# Patient Record
Sex: FEMALE | Race: WHITE | NOT HISPANIC OR LATINO | Employment: OTHER | URBAN - METROPOLITAN AREA
[De-identification: names, ages, dates, MRNs, and addresses within clinical notes are randomized per-mention and may not be internally consistent; named-entity substitution may affect disease eponyms.]

---

## 2017-04-12 ENCOUNTER — OFFICE VISIT (OUTPATIENT)
Dept: ORTHOPEDIC SURGERY | Facility: CLINIC | Age: 77
End: 2017-04-12

## 2017-04-12 VITALS — BODY MASS INDEX: 28.88 KG/M2 | HEIGHT: 67 IN | TEMPERATURE: 99.2 F | WEIGHT: 184 LBS

## 2017-04-12 DIAGNOSIS — G89.29 CHRONIC PAIN OF RIGHT KNEE: Primary | ICD-10-CM

## 2017-04-12 DIAGNOSIS — M25.561 CHRONIC PAIN OF RIGHT KNEE: Primary | ICD-10-CM

## 2017-04-12 DIAGNOSIS — M17.11 PRIMARY OSTEOARTHRITIS OF RIGHT KNEE: ICD-10-CM

## 2017-04-12 PROCEDURE — 73560 X-RAY EXAM OF KNEE 1 OR 2: CPT | Performed by: ORTHOPAEDIC SURGERY

## 2017-04-12 PROCEDURE — 99204 OFFICE O/P NEW MOD 45 MIN: CPT | Performed by: ORTHOPAEDIC SURGERY

## 2017-04-12 RX ORDER — NAPROXEN SODIUM 220 MG
220 TABLET ORAL 2 TIMES DAILY PRN
COMMUNITY
End: 2018-05-30 | Stop reason: SDUPTHER

## 2017-04-12 RX ORDER — BENAZEPRIL HYDROCHLORIDE 20 MG/1
20 TABLET ORAL DAILY
COMMUNITY
End: 2018-05-30 | Stop reason: SDUPTHER

## 2017-04-12 RX ORDER — VERAPAMIL HYDROCHLORIDE 300 MG/1
300 CAPSULE, EXTENDED RELEASE ORAL NIGHTLY
COMMUNITY
End: 2019-05-09

## 2017-04-12 RX ORDER — ATORVASTATIN CALCIUM 40 MG/1
40 TABLET, FILM COATED ORAL DAILY
COMMUNITY

## 2017-04-12 RX ORDER — METOPROLOL TARTRATE 100 MG/1
100 TABLET ORAL 2 TIMES DAILY
COMMUNITY
End: 2019-05-09

## 2017-04-12 RX ORDER — CHLORAL HYDRATE 500 MG
CAPSULE ORAL
COMMUNITY

## 2017-04-12 NOTE — PROGRESS NOTES
Chief Complaint   Patient presents with   • Right Knee - Establish Care, Pain             HPI  At the end of March, patient went to get up out of chair, knee gave out.  She tried to stop herself from falling, and she twisted it.  It hurt for 4 days, then slowly got better.  She reports that it locks occasionally.  Going up and down steps is bothersome and worrisome.  She is worried of falling.She has a lot of pain and discomfort on the medial aspect of the knee.  When asked about her functional capability she states that she can barely walk across the parking lot without pain and discomfort.  Her knee tends to buckle and shift into varus with the her attempting to walk.  She is concerned about falling down the steps because going down the steps is very painful for her.  She states that there are some days when she is hurting so significantly that she cannot even get out of bed and then there are other days when her symptoms are not so bad.  She states that she has taken fish oil and anti-inflammatory medication to help her with her symptoms.  There is always a baseline grinding aching sensation which gets worse when she is on uneven surfaces or inclined planes.  Her quality of life is very negative and she is wanting to proceed with definitive care including considerations for total knee arthroplasty.          Allergies   Allergen Reactions   • Niaspan [Niacin Er] Itching         Social History     Social History   • Marital status:      Spouse name: N/A   • Number of children: N/A   • Years of education: N/A     Occupational History   • Not on file.     Social History Main Topics   • Smoking status: Not on file   • Smokeless tobacco: Not on file   • Alcohol use Not on file   • Drug use: Not on file   • Sexual activity: Not on file     Other Topics Concern   • Not on file     Social History Narrative       No family history on file.    No past surgical history on file.    No past medical history on  file.        Vitals:    04/12/17 1535   Temp: 99.2 °F (37.3 °C)             Review of Systems   Constitutional: Negative for chills, fever and unexpected weight change.   HENT: Negative for trouble swallowing and voice change.    Eyes: Negative for visual disturbance.   Respiratory: Negative for cough and shortness of breath.    Cardiovascular: Negative for chest pain and leg swelling.   Gastrointestinal: Negative for abdominal pain, nausea and vomiting.   Endocrine: Negative for cold intolerance and heat intolerance.   Genitourinary: Negative for difficulty urinating, frequency and urgency.   Skin: Negative for rash and wound.   Allergic/Immunologic: Negative for immunocompromised state.   Neurological: Negative for weakness and numbness.   Hematological: Does not bruise/bleed easily.   Psychiatric/Behavioral: Positive for sleep disturbance. Negative for dysphoric mood. The patient is not nervous/anxious.            Physical Exam   Constitutional: She is oriented to person, place, and time. She appears well-developed.   HENT:   Head: Normocephalic and atraumatic.   Eyes: Pupils are equal, round, and reactive to light.   Neck: Normal range of motion.   Cardiovascular: Intact distal pulses.    Pulmonary/Chest: Effort normal and breath sounds normal.   Abdominal: Soft. Bowel sounds are normal.   Musculoskeletal: Normal range of motion.   Neurological: She is alert and oriented to person, place, and time. She has normal reflexes.   Skin: Skin is warm.   Psychiatric: She has a normal mood and affect. Her behavior is normal. Judgment and thought content normal.   Nursing note and vitals reviewed.              Joint/Body Part Specific Exam:  right knee. Patient has crepitus throughout range of motion. Positive patellar grind test. Mild effusion. Lachman is negative. Pivot shift is negative. Anterior and posterior drawer signs are negative. Significant joint line tenderness is noted on the medial aspect of the knee.  Patient has a varus orientation of the knee. Range of motion in flexion is for 0- 110° degrees. Neurovascular status intact.  Dorsalis pedis and posterior tibial artery pulses are palpable. Common peroneal nerve function is well preserved. Patients gait is cautious and antalgic. Skin and soft tissues are swollen; consistent with synovitis and effusion          X-RAY Report:  right Knee X-Ray  Indication: Evaluation for pain and discomfort on the medial aspect of the knee  AP, Lateral views  Findings: Advanced degenerative osteoarthritis with complete loss of medial joint space  no bony lesion  Soft tissues within normal limits  decreased joint spaces  Hardware appropriately positioned not applicable      no prior studies available for comparison.    X-RAY was ordered and reviewed by Kalen Harris MD          Diagnostics:            Vimal was seen today for establish care and pain.    Diagnoses and all orders for this visit:    Chronic pain of right knee  -     XR knee 1 or 2 vw right            Procedures          I provided this patient with educational materials regarding bone health and cast or splint care.        Plan:   Weight bearing as tolerated.    Stretching and strengthening exercises of the quads and the hamstrings.    I have offered the patient a steroid injection as well as Synvisc Visco supplementation she is going to think about that.    Tablet Mobic 7.5 mg tab 1 by mouth daily when necessary pain and discomfort.    Use a supportive brace to prevent the knee from buckling and giving out.    This patient is going to need a total knee arthroplasty.    Risks and benefits potential complications discussed with the patient and her  Catalino.    Time spent in the office with the patient examining her and discussing different treatment options and making a surgical decision is 45 minutes.            CC To ANNETTE Gil

## 2017-05-09 ENCOUNTER — TELEPHONE (OUTPATIENT)
Dept: ORTHOPEDIC SURGERY | Facility: CLINIC | Age: 77
End: 2017-05-09

## 2017-06-19 ENCOUNTER — OUTSIDE FACILITY SERVICE (OUTPATIENT)
Dept: ORTHOPEDIC SURGERY | Facility: CLINIC | Age: 77
End: 2017-06-19

## 2017-06-19 PROCEDURE — 27447 TOTAL KNEE ARTHROPLASTY: CPT | Performed by: ORTHOPAEDIC SURGERY

## 2017-06-19 PROCEDURE — 20985 CPTR-ASST DIR MS PX: CPT | Performed by: ORTHOPAEDIC SURGERY

## 2017-07-10 RX ORDER — HYDROCODONE BITARTRATE AND ACETAMINOPHEN 5; 325 MG/1; MG/1
1 TABLET ORAL EVERY 6 HOURS PRN
Qty: 40 TABLET | Refills: 0 | Status: SHIPPED | OUTPATIENT
Start: 2017-07-10 | End: 2019-05-09

## 2017-07-10 NOTE — TELEPHONE ENCOUNTER
Patient is requesting a refill of Norco 5/325MG 1 po 6hrs prn pain she had R TKA on 6/19/17. Please Advise.

## 2017-07-11 NOTE — TELEPHONE ENCOUNTER
Patient has been informed via voicemail that her rx will be ready in our [place] office after 2pm today

## 2017-07-14 ENCOUNTER — OFFICE VISIT (OUTPATIENT)
Dept: ORTHOPEDIC SURGERY | Facility: CLINIC | Age: 77
End: 2017-07-14

## 2017-07-14 DIAGNOSIS — Z96.651 STATUS POST TOTAL RIGHT KNEE REPLACEMENT: Primary | ICD-10-CM

## 2017-07-14 PROCEDURE — 73560 X-RAY EXAM OF KNEE 1 OR 2: CPT | Performed by: ORTHOPAEDIC SURGERY

## 2017-07-14 PROCEDURE — 99024 POSTOP FOLLOW-UP VISIT: CPT | Performed by: ORTHOPAEDIC SURGERY

## 2017-07-14 NOTE — PROGRESS NOTES
Chief Complaint   Patient presents with   • Right Knee - Follow-up   • Wound Check         HPI the patient is here today for a follow up of her right total knee arthroplasty that was done on 6/19/17.Patient is doing extremely well postoperatively.  The swelling and bruising are settling down nicely.  Patient has completed her course of xarelto for DVT prophylaxis.  She is participating in a physical therapy program.  Patient is very pleased with her progress as her walking distance has improved tremendously.  She does not have nearly as much pain in her knee as she had prior to the surgical intervention.  Her clinical deformity is also very well corrected.        There were no vitals filed for this visit.        Joint/Body Part Specific Exam:  right knee.The patient is status post total knee arthroplasty postoperative 4 week(s). Incision is clean. Calf is soft and nontender. Homans sign is negative. There is no clicking, popping or catching. Anterior and posterior drawer signs are negative, Appropriate amounts of swelling and bruising are noted. Dorsalis pedis and posterior tibial artery pulses are palpable. Common peroneal nerve function is well preserved. Range of motion is from 0- 90° degrees. Gait is cautious but otherwise fairly normal. There is no evidence of a deep seated joint infection.      X-RAY REPORT:  right Knee X-Ray  Indication: Evaluation of implants after total knee arthroplasty  AP, Lateral views  Findings: Well placed implants with a good cement mantle no subsidence  no bony lesion  Soft tissues within normal limits  within normal limits joint spaces  Hardware appropriately positioned yes      yes prior studies available for comparison.    X-RAY was ordered and reviewed by Kalen Harris MD      Vimal was seen today for wound check and follow-up.    Diagnoses and all orders for this visit:    Status post total right knee replacement  -     XR Knee 1 or 2 View  Right          Procedures        Instructions:      Plan:Incision care.    Gentle active mobilization of the quads and the hamstrings.    Follow the total knee arthroplasty rehabilitation protocol.    Falls precautions.    Continue with outpatient physical therapy.    Patient is not asking me for any form off narcotic medication to control her pain and symptoms.    Tablet aspirin 325 mg stab 1 by mouth daily for DVT prophylaxis.    Follow-up in my office in 6 weeks.

## 2017-07-18 PROBLEM — Z96.651 STATUS POST TOTAL RIGHT KNEE REPLACEMENT: Status: ACTIVE | Noted: 2017-07-18

## 2017-08-24 ENCOUNTER — OFFICE VISIT (OUTPATIENT)
Dept: ORTHOPEDIC SURGERY | Facility: CLINIC | Age: 77
End: 2017-08-24

## 2017-08-24 DIAGNOSIS — Z96.651 STATUS POST TOTAL RIGHT KNEE REPLACEMENT: ICD-10-CM

## 2017-08-24 DIAGNOSIS — Z96.651 HISTORY OF TOTAL KNEE ARTHROPLASTY, RIGHT: Primary | ICD-10-CM

## 2017-08-24 PROCEDURE — 73560 X-RAY EXAM OF KNEE 1 OR 2: CPT | Performed by: ORTHOPAEDIC SURGERY

## 2017-08-24 PROCEDURE — 99024 POSTOP FOLLOW-UP VISIT: CPT | Performed by: ORTHOPAEDIC SURGERY

## 2017-08-24 NOTE — PROGRESS NOTES
Chief Complaint   Patient presents with   • Right Knee - Follow-up         HPI the patient is here today for a follow up of her right total knee arthroplasty that was done on 6/19/2017.Patient is doing extremely well at this point.  Range of motion is improving significantly.  Her pain is minimal.  She is not using any pain medication at this point.  She has completed her course of xarelto.  She is also graduated from her physical therapy protocol.  Overall she is very pleased with her outcome.             There were no vitals filed for this visit.        Joint/Body Part Specific Exam:  right knee.The patient is status post total knee arthroplasty postoperative 9 week(s). Incision is clean. Calf is soft and nontender. Homans sign is negative. There is no clicking, popping or catching. Anterior and posterior drawer signs are negative, Appropriate amounts of swelling and bruising are noted. Dorsalis pedis and posterior tibial artery pulses are palpable. Common peroneal nerve function is well preserved. Range of motion is from 0- 120° degrees. Gait is cautious but otherwise fairly normal. There is no evidence of a deep seated joint infection.      X-RAY REPORT:  right Knee X-Ray  Indication: Evaluation of implant position after total knee arthroplasty  AP, Lateral views  Findings: Well placed total knee arthroplasty implants without any subsidence.  no bony lesion  Soft tissues within normal limits  within normal limits joint spaces  Hardware appropriately positioned yes      yes prior studies available for comparison.    X-RAY was ordered and reviewed by Kalen Harris MD      Vimal was seen today for follow-up.    Diagnoses and all orders for this visit:    History of total knee arthroplasty, right  -     XR Knee 1 or 2 View Right    Status post total right knee replacement          Procedures        Instructions:      Plan:Incision care.    Weightbearing as tolerated.    Continue with aggressive physical therapy to  rehabilitate the total knee arthroplasty.    Tablet ibuprofen 600 mg tab 1 by mouth twice a day p.m. pain and discomfort.    Uses supportive Ace bandage from toes to groin to help decrease the swelling.    Ice for comfort.    Follow-up in my office in 6 months.    , GI and dental procedure prophylaxis with antibiotics to prevent metastatic infection to the knee arthroplasty implants.

## 2018-02-22 ENCOUNTER — OFFICE VISIT (OUTPATIENT)
Dept: ORTHOPEDIC SURGERY | Facility: CLINIC | Age: 78
End: 2018-02-22

## 2018-02-22 DIAGNOSIS — Z96.651 STATUS POST TOTAL RIGHT KNEE REPLACEMENT: ICD-10-CM

## 2018-02-22 DIAGNOSIS — Z96.651 HISTORY OF TOTAL KNEE ARTHROPLASTY, RIGHT: Primary | ICD-10-CM

## 2018-02-22 PROCEDURE — 73560 X-RAY EXAM OF KNEE 1 OR 2: CPT | Performed by: ORTHOPAEDIC SURGERY

## 2018-02-22 PROCEDURE — 99213 OFFICE O/P EST LOW 20 MIN: CPT | Performed by: ORTHOPAEDIC SURGERY

## 2018-05-30 ENCOUNTER — OFFICE VISIT (OUTPATIENT)
Dept: ORTHOPEDIC SURGERY | Facility: CLINIC | Age: 78
End: 2018-05-30

## 2018-05-30 VITALS — BODY MASS INDEX: 29.41 KG/M2 | HEIGHT: 66 IN | WEIGHT: 183 LBS | TEMPERATURE: 97.8 F

## 2018-05-30 DIAGNOSIS — M54.10 RADICULOPATHY, UNSPECIFIED SPINAL REGION: Primary | ICD-10-CM

## 2018-05-30 DIAGNOSIS — Z96.651 STATUS POST TOTAL RIGHT KNEE REPLACEMENT: ICD-10-CM

## 2018-05-30 PROCEDURE — 99213 OFFICE O/P EST LOW 20 MIN: CPT | Performed by: ORTHOPAEDIC SURGERY

## 2018-05-30 RX ORDER — ICOSAPENT ETHYL 1000 MG/1
CAPSULE ORAL
COMMUNITY
Start: 2018-05-14 | End: 2019-05-09

## 2018-05-30 RX ORDER — METOPROLOL SUCCINATE 100 MG/1
TABLET, EXTENDED RELEASE ORAL
COMMUNITY
Start: 2018-05-14

## 2018-05-30 RX ORDER — MELOXICAM 7.5 MG/1
TABLET ORAL
COMMUNITY
Start: 2018-05-10 | End: 2019-05-09

## 2018-05-30 RX ORDER — METHOCARBAMOL 750 MG/1
TABLET, FILM COATED ORAL
COMMUNITY
Start: 2018-04-16 | End: 2019-05-09

## 2018-05-30 RX ORDER — CLONIDINE HYDROCHLORIDE 0.1 MG/1
TABLET ORAL
COMMUNITY
Start: 2018-05-14

## 2018-05-30 RX ORDER — METHYLPREDNISOLONE 4 MG/1
TABLET ORAL
COMMUNITY
Start: 2018-04-20 | End: 2019-05-09

## 2018-05-30 RX ORDER — BENAZEPRIL HYDROCHLORIDE 40 MG/1
TABLET, FILM COATED ORAL
COMMUNITY
Start: 2018-05-14

## 2019-05-09 ENCOUNTER — OFFICE VISIT (OUTPATIENT)
Dept: ORTHOPEDIC SURGERY | Facility: CLINIC | Age: 79
End: 2019-05-09

## 2019-05-09 VITALS — BODY MASS INDEX: 30.18 KG/M2 | HEIGHT: 66 IN | WEIGHT: 187.8 LBS

## 2019-05-09 DIAGNOSIS — M16.12 PRIMARY OSTEOARTHRITIS OF LEFT HIP: Primary | ICD-10-CM

## 2019-05-09 DIAGNOSIS — Z96.651 STATUS POST TOTAL RIGHT KNEE REPLACEMENT: ICD-10-CM

## 2019-05-09 PROCEDURE — 99214 OFFICE O/P EST MOD 30 MIN: CPT | Performed by: ORTHOPAEDIC SURGERY

## 2019-05-09 PROCEDURE — 73502 X-RAY EXAM HIP UNI 2-3 VIEWS: CPT | Performed by: ORTHOPAEDIC SURGERY

## 2019-05-09 RX ORDER — VERAPAMIL HYDROCHLORIDE 360 MG/1
CAPSULE, DELAYED RELEASE PELLETS ORAL
COMMUNITY
Start: 2019-02-12

## 2019-05-09 NOTE — PROGRESS NOTES
NEW/FOLLOW UP VISIT    Patient: Vimal Garcia  ?  YOB: 1940  ?  Chief Complaint   Patient presents with   • Left Hip - Pain      ?  HPI: Patient is having increasing pain and discomfort in the left hip.  She has difficulty with rolling over in bed at night.  She has difficulty in going up and down the steps.  She states that she cannot walk for exercise because of the pain and limp caused by the hip pathology.  She has had this pain for over 1 year.  Her symptoms are progressively getting worse and have definitely declined in the past few months in terms of increased pain and decreased function.  The patient states that she is using a cane because of pain and discomfort from the hip.  The patient had a right total knee replacement performed by me about 2 years ago.  That surgical procedure helped her tremendously to improve her quality of life and to improve her walking and exercise profiles.  She is very pleased with the outcome from her right hip arthroplasty.  Pain Location: Left groin and anterior aspect of the hip.  Radiation: none  Quality: dull, aching and grinding  Intensity/Severity: moderate  Duration: Past 1 year.  Onset quality: gradual   Timing: intermittent  Aggravating Factors: Going up and down the steps.  Alleviating Factors: Resting the hip and use of a cane.  Previous Episodes: none mentioned  Associated Symptoms: pain, swelling, decreased ROM  Previous Treatment: Anti-inflammatory medication    This patient is an established patient.  This problem is not new to this examiner.      Allergies:   Allergies   Allergen Reactions   • Niacin Itching   • Niaspan [Niacin Er] Itching       Medications:   Home Medications:  Current Outpatient Medications on File Prior to Visit   Medication Sig   • atorvastatin (LIPITOR) 40 MG tablet Take 40 mg by mouth Daily.   • benazepril (LOTENSIN) 40 MG tablet    • CloNIDine (CATAPRES) 0.1 MG tablet    • metFORMIN (GLUCOPHAGE) 1000 MG tablet Take 1,000  "mg by mouth 2 (Two) Times a Day With Meals.   • metoprolol succinate XL (TOPROL-XL) 100 MG 24 hr tablet    • Omega-3 Fatty Acids (FISH OIL) 1000 MG capsule capsule Take  by mouth Daily With Breakfast.   • verapamil PM (VERELAN PM) 360 MG 24 hr capsule      No current facility-administered medications on file prior to visit.      Current Medications:  Scheduled Meds:  PRN Meds:.    I have reviewed the patient's medical history in detail and updated the computerized patient record.  Review and summarization of old records include:    History reviewed. No pertinent past medical history.  No past surgical history on file.  Social History     Occupational History   • Not on file   Tobacco Use   • Smoking status: Never Smoker   Substance and Sexual Activity   • Alcohol use: Yes     Comment: occasional   • Drug use: Not on file   • Sexual activity: Not on file      Social History     Social History Narrative   • Not on file     History reviewed. No pertinent family history.      Review of Systems   Constitutional: Negative.    HENT: Negative.    Eyes: Negative.    Respiratory: Negative.    Cardiovascular: Negative.    Gastrointestinal: Negative.    Endocrine: Negative.    Genitourinary: Negative.    Musculoskeletal: Positive for gait problem and joint swelling.   Skin: Negative.    Allergic/Immunologic: Negative.    Hematological: Negative.    Psychiatric/Behavioral: Negative.           Wt Readings from Last 3 Encounters:   05/09/19 85.2 kg (187 lb 12.8 oz)   05/30/18 83 kg (183 lb)   04/12/17 83.5 kg (184 lb)     Ht Readings from Last 3 Encounters:   05/09/19 167.6 cm (66\")   05/30/18 167.6 cm (66\")   04/12/17 170.2 cm (67\")     Body mass index is 30.31 kg/m².  Facility age limit for growth percentiles is 20 years.  There were no vitals filed for this visit.      Physical Exam   Constitutional: Patient is oriented to person, place, and time. Appears well-developed and well-nourished.   HENT:   Head: Normocephalic and " atraumatic.   Eyes: Conjunctivae and EOM are normal. Pupils are equal, round, and reactive to light.   Cardiovascular: Normal rate, regular rhythm, normal heart sounds and intact distal pulses.   Pulmonary/Chest: Effort normal and breath sounds normal.   Musculoskeletal:   See detailed exam below   Neurological: Alert and oriented to person, place, and time. No sensory deficit. Coordination normal.   Skin: Skin is warm and dry. Capillary refill takes less than 2 seconds. No rash noted. No erythema.   Psychiatric: Patient has a normal mood and affect. Her behavior is normal. Judgment and thought content normal.   Nursing note and vitals reviewed.    Ortho Exam:   Left hip (djd). Neurovascular status is intact. Patient has a distant limp on the affected side. Internal and external rotations are associated with pain and discomfort. Anterior joint line pain and tenderness is significant. Stinchfield sign is positive. Figure of 4 sign is positive. Patient is unable to perform an active straight leg raise exam. Greater trochanter is tender. Crossover adduction test is positive. Cross body adduction is limited and painful for the patient. Patient has very significant limp and joint line tenderness anteriorly, posteriorly and medially. Dorsalis pedis and posterior tibial artery pulses are palpable. Common peroneal nerve function is well preserved.    Right knee.The patient is status post total knee arthroplasty postoperative 2 year(s). Incision is clean. Calf is soft and nontender. Homans sign is negative. There is no clicking, popping or catching. Anterior and posterior drawer signs are negative.  There is no instability of the components. Appropriate amounts of swelling and bruising are noted. Dorsalis pedis and posterior tibial artery pulses are palpable. Common peroneal nerve function is well preserved. Range of motion is from 0-130 degrees of flexion. Gait is cautious but otherwise fairly normal. There is no evidence  of a deep seated joint infection.      Diagnostics:left Hip X-Ray  Indication: Evaluation of pain and discomfort in the groin.  AP and lateral  Findings: Advanced hip arthritis with complete loss of joint space.  no bony lesion  Soft tissues within normal limits  decreased joint spaces  Hardware appropriately positioned not applicable      no prior studies available for comparison.    X-RAY was ordered and reviewed by Kalen Harris MD           Assessment:  Vimal was seen today for pain.    Diagnoses and all orders for this visit:    Primary osteoarthritis of left hip  -     XR Hip With or Without Pelvis 2 - 3 View Left    Status post total right knee replacement          Procedures  ?    Plan    · Compression/brace  · Rest, ice, compression, and elevation (RICE) therapy  · Stretching and strengthening exercises of the hip flexors and abductors.  · Use a cane for assistance with ambulation.  · , GI and dental procedure prophylaxis with antibiotics to prevent metastatic infection to the knee arthroplasty implants.  · Extensive discussion with the patient and her  about different treatment options including surgical replacement of the hip.  · Time spent in the office today with the patient is 30 minutes of which greater than 50% of my time was spent face-to-face with the patient going over the treatment options and the potential complications of left hip replacement surgery through an anterior approach.  The patient was seen today for preoperative discussion.  The patient has been tried on over-the-counter and prescription NSAID's despite the risks of anti-inflammatory bleeding, peptic ulcers and erosive gastritis with short term benefit only.  Braces have been prescribed for mechanical support.  Patient has been participating in an exercise program specifically targeting joint pain relief with limited benefit. Intraarticular injections have been used periodically with some but not complete relief of pain.   Ambulation aids have also been utilized.      · The details of the surgical procedure were explained including the location of probable incisions and a description of the likely hardware/grafts to be used. The patient understands the likely convalescence after surgery as well as the rehabilitation required.  Also, we have thoroughly discussed with the patient the risks, benefits and alternatives to surgery.  Risks include but are not limited to the risk of infection, joint stiffness, limited range of motion, wound healing problems, scar tissue build up, myocardial infarction, stroke, blood clots (including DVT and/or pulmonary embolus along with the risk of death) neurologic and/or vascular injury, limb length discrepancy, fracture, dislocation, nonunion, malunion, continued pain and need for further surgery including hardware failure requiring revision.   · OTC Tylenol 1000mg by mouth every 4-6 hours as needed for pain   · Follow up in 6 week(s)    Kalen Harris MD  05/09/2019

## 2019-05-14 PROBLEM — M16.12 PRIMARY OSTEOARTHRITIS OF LEFT HIP: Status: ACTIVE | Noted: 2019-05-14

## 2019-06-28 ENCOUNTER — OUTSIDE FACILITY SERVICE (OUTPATIENT)
Dept: ORTHOPEDIC SURGERY | Facility: CLINIC | Age: 79
End: 2019-06-28

## 2019-06-28 PROCEDURE — 27130 TOTAL HIP ARTHROPLASTY: CPT | Performed by: ORTHOPAEDIC SURGERY

## 2019-07-05 ENCOUNTER — TELEPHONE (OUTPATIENT)
Dept: ORTHOPEDIC SURGERY | Facility: CLINIC | Age: 79
End: 2019-07-05

## 2019-07-05 NOTE — TELEPHONE ENCOUNTER
It is okay for this patient to have a refill of tramadol.  Please go ahead and send it to me electronically.  Tablet tramadol 50 mg tab 1 p.o. every 6 as needed pain total 40 pills thank you

## 2019-07-05 NOTE — TELEPHONE ENCOUNTER
CALLED INTO [facility] TRAMADOL 50 MG 1 TABLET EVERY 4-6 HOURS #40 NO REFILLS PER [facility]

## 2019-07-14 PROBLEM — Z96.642 STATUS POST TOTAL REPLACEMENT OF LEFT HIP: Status: ACTIVE | Noted: 2019-07-14

## 2019-07-14 NOTE — PROGRESS NOTES
POST OP TOTAL GLOBAL      NAME: Vimal Garcia           : 1940    MRN: [id number]    No chief complaint on file.      Date of Surgery: 19  ?  HPI:   Patient returns today for 17 day(s) follow up of left total hip arthroplasty. Incision(s) healing nicely/as expected with no signs of infection. Patient reports doing well with no unusual complaints. No fevers, rigors or chills. Appears to be progressing appropriately. Patient is on appropriate anticoagulation.  Patient states she is not experiencing much pain and only having to use pain medication prior to physical therapy sessions.      Review of Systems:      Ortho Exam:   Left hip. Patient is post op 17 day(s) from total hip arthoplasty, direct anterior. Incision is clean and healing well. Calf is soft and nontender. Homans sign is negative. Skin and soft tissues are appropriate for postoperative status of the patient. Hip flexion is 0-30 degrees, hip abduction is 0-30 degrees. No limb lenth discrepancy. Neurovascular status is intact. Patients gait is significantly improved. The pain relief is extremely dramatic for the patient. Dorsalis pedis and posterior tibial artery pulses palpable. Common peroneal function is well preserved. There is no evidence of cellulitis or erythema or deep seated joint infection.      Diagnostic Studies:  X-Ray Report:  Left hip(s) X-Ray  Indication: Evaluation of implant following left total hip arthroplasty  AP, Lateral views  Findings: Well-positioned implant  Bony lesion: no  Soft tissues: within normal limits  Joint spaces: within normal limits  Hardware appropriately positioned: yes  Prior studies available for comparison: yes   X-RAY was ordered and reviewed by Deepak Oconnor PA-C        Assessment:  Diagnoses and all orders for this visit:    Status post total replacement of left hip      Plan   · Incision care  · To use ACE wrap/TACO stocking  · Continue ice to joint   · Stretching and strengthening  exercises/continue with PT per protocol  · Falls precautions  · Complete the total dose of Xarelto given (35 days total of Xarelto 10mg), once completed take Aspirin 325mg daily x 2 weeks  · Continue with lifelong antibiotic prophylaxis with dental procedures following total joint replacement.  · Follow up in 4-6 week(s)    Date of encounter: 07/15/2019   Deepak Oconnor PA-C

## 2019-07-15 ENCOUNTER — OFFICE VISIT (OUTPATIENT)
Dept: ORTHOPEDIC SURGERY | Facility: CLINIC | Age: 79
End: 2019-07-15

## 2019-07-15 VITALS — BODY MASS INDEX: 29.57 KG/M2 | HEIGHT: 66 IN | TEMPERATURE: 98.3 F | WEIGHT: 184 LBS

## 2019-07-15 DIAGNOSIS — Z96.642 STATUS POST TOTAL REPLACEMENT OF LEFT HIP: Primary | ICD-10-CM

## 2019-07-15 PROCEDURE — 73502 X-RAY EXAM HIP UNI 2-3 VIEWS: CPT | Performed by: PHYSICIAN ASSISTANT

## 2019-07-15 PROCEDURE — 99024 POSTOP FOLLOW-UP VISIT: CPT | Performed by: PHYSICIAN ASSISTANT

## 2019-07-26 ENCOUNTER — TELEPHONE (OUTPATIENT)
Dept: ORTHOPEDIC SURGERY | Facility: CLINIC | Age: 79
End: 2019-07-26

## 2019-07-26 NOTE — TELEPHONE ENCOUNTER
PATIENT WANTS TO KNOW IF SHE MAY DRIVE. SHE IS STATUS POST LEFT DIRECT ANTERIOR TOTAL HIP REPLACEMENT FROM 6/28/19. SHE IS NOT TAKING PAIN MEDICINE ANYMORE AND HOME HEALTH HAS DISCHARGED HER. PLEASE ADVISE. [phone].

## 2019-08-29 ENCOUNTER — OFFICE VISIT (OUTPATIENT)
Dept: ORTHOPEDIC SURGERY | Facility: CLINIC | Age: 79
End: 2019-08-29

## 2019-08-29 DIAGNOSIS — Z96.642 STATUS POST TOTAL REPLACEMENT OF LEFT HIP: Primary | ICD-10-CM

## 2019-08-29 PROCEDURE — 99024 POSTOP FOLLOW-UP VISIT: CPT | Performed by: ORTHOPAEDIC SURGERY

## 2019-08-29 NOTE — PROGRESS NOTES
POST OP TOTAL GLOBAL      NAME: Vimal Garcia           : 1940    MRN: [id number]    Chief Complaint   Patient presents with   • Left Hip - Post-op       Date of Surgery: 19  ?  HPI:   Patient returns today for 8 week(s) follow up of left total hip arthroplasty. Incision(s) healed nicely with no signs of infection. Patient reports doing well with no unusual complaints. No fevers, rigors or chills. Appears to be progressing appropriately. Patient is on appropriate anticoagulation.  The patient is very pleased with her outcome.  She does not have a limb length discrepancy.  She is neurovascularly intact.  She is ambulating without a cane or a walker.  She has completed her course of physical therapy.  She is very grateful for the hip replacement because she does not need any pain medication for the hip symptoms at this point.      Review of Systems:      Ortho Exam:   Left hip. Patient is post op 8 week(s) from total hip arthoplasty, direct anterior. Incision is clean and healing well. Calf is soft and nontender. Homans sign is negative. Skin and soft tissues are appropriate for postoperative status of the patient. Hip flexion is 0-90 degrees, hip abduction is 0-30 degrees. No limb lenth discrepancy. Neurovascular status is intact. Patients gait is significantly improved. The pain relief is extremely dramatic for the patient. Dorsalis pedis and posterior tibial artery pulses palpable. Common peroneal function is well preserved. There is no evidence of cellulitis or erythema or deep seated joint infection.      Diagnostic Studies:  no diagnostic testing performed this visit        Assessment:  Vimal was seen today for post-op.    Diagnoses and all orders for this visit:    Status post total replacement of left hip            Plan   · Incision care  · To use ACE wrap/TACO stocking  · Continue ice to joint   · Stretching and strengthening exercises of the quads and the hamstrings.  · Aggressive ROM  · Falls  precautions  · You may now resume any prescription medications you were taking prior to surgery  · Continue with lifelong antibiotic prophylaxis with dental procedures following total joint replacement.  · Follow up in 1 year(s)    Date of encounter: 08/29/2019   Kalen Harris MD

## 2020-01-15 ENCOUNTER — OFFICE VISIT (OUTPATIENT)
Dept: ORTHOPEDIC SURGERY | Facility: CLINIC | Age: 80
End: 2020-01-15

## 2020-01-15 VITALS — HEIGHT: 65 IN | TEMPERATURE: 97.5 F | WEIGHT: 189.9 LBS | BODY MASS INDEX: 31.64 KG/M2

## 2020-01-15 DIAGNOSIS — M16.11 PRIMARY OSTEOARTHRITIS OF RIGHT HIP: Primary | ICD-10-CM

## 2020-01-15 PROCEDURE — 73502 X-RAY EXAM HIP UNI 2-3 VIEWS: CPT | Performed by: PHYSICIAN ASSISTANT

## 2020-01-15 PROCEDURE — 99214 OFFICE O/P EST MOD 30 MIN: CPT | Performed by: PHYSICIAN ASSISTANT

## 2020-01-16 NOTE — PROGRESS NOTES
NEW VISIT    Patient: Vimal Garcia  ?  YOB: 1940    MRN: [id number]  ?  Chief Complaint   Patient presents with   • Right Hip - Establish Care, Pain      ?  HPI:   Mrs. Garcia is a 79 year old female who presents with new complaint of right hip pain. She is an established patient of the practice and recently underwent a left total hip replacement by Dr. Harris in June 2019. She reports such a great outcome from that surgery that should would like to proceed with surgical intervention of the opposite hip in hopes to experience just as great pain relief. She reports pain in the right hip for 6 months with recent worsening. She reports pain in the right anterior groin with radiation into the anterior thigh. She denies any bladder or bowel dysfunction. She states she finds herself sitting much more than usual because any weightbearing causes significant discomfort.   Pain Location: right hip  Radiation: into anterior aspect of right thigh  Quality: aching  Intensity/Severity: severe  Duration: 6 months  Onset quality: gradual   Timing: intermittent  Aggravating Factors: walking, standing  Alleviating Factors: NSAIDs, ice, sitting  Previous Episodes: yes with opposite hip  Associated Symptoms: pain, clicking/popping  ADLs Affected: ambulating, recreational activities/sports    This patient is an established patient.  This problem is new to this examiner.      Allergies:   Allergies   Allergen Reactions   • Niacin Itching   • Niaspan [Niacin Er] Itching       Medications:   Home Medications:  Current Outpatient Medications on File Prior to Visit   Medication Sig   • atorvastatin (LIPITOR) 40 MG tablet Take 40 mg by mouth Daily.   • benazepril (LOTENSIN) 40 MG tablet    • CloNIDine (CATAPRES) 0.1 MG tablet    • metFORMIN (GLUCOPHAGE) 1000 MG tablet Take 1,000 mg by mouth 2 (Two) Times a Day With Meals.   • metoprolol succinate XL (TOPROL-XL) 100 MG 24 hr tablet    • Omega-3 Fatty Acids (FISH OIL) 1000 MG  "capsule capsule Take  by mouth Daily With Breakfast.   • verapamil PM (VERELAN PM) 360 MG 24 hr capsule      No current facility-administered medications on file prior to visit.      Current Medications:  Scheduled Meds:  PRN Meds:.    I have reviewed the patient's medical history in detail and updated the computerized patient record.  Review and summarization of old records include:    Past Medical History:   Diagnosis Date   • Diabetes (CMS/HCC)    • Hyperlipidemia    • Hypertension      Past Surgical History:   Procedure Laterality Date   • HIP SURGERY     • JOINT REPLACEMENT       Social History     Occupational History   • Not on file   Tobacco Use   • Smoking status: Never Smoker   Substance and Sexual Activity   • Alcohol use: Yes     Comment: occasional   • Drug use: Not on file   • Sexual activity: Not on file      Social History     Social History Narrative   • Not on file     Family History   Problem Relation Age of Onset   • Cancer Mother         uterine         Review of Systems  Constitutional: Negative.  Negative for fever.   Eyes: Negative.    Respiratory: Negative.    Cardiovascular: Negative.    Endocrine: Negative.    Musculoskeletal: Positive for arthralgias and gait problem.   Skin: Negative.  Negative for rash and wound.   Allergic/Immunologic: Negative.    Neurological: Negative for numbness.   Hematological: Negative.    Psychiatric/Behavioral: Negative.         Wt Readings from Last 3 Encounters:   01/15/20 86.1 kg (189 lb 14.4 oz)   07/15/19 83.5 kg (184 lb)   05/09/19 85.2 kg (187 lb 12.8 oz)     Ht Readings from Last 3 Encounters:   01/15/20 165.1 cm (65\")   07/15/19 167.6 cm (66\")   05/09/19 167.6 cm (66\")     Body mass index is 31.6 kg/m².  Facility age limit for growth percentiles is 20 years.  Vitals:    01/15/20 1254   Temp: 97.5 °F (36.4 °C)         Physical Exam  Constitutional: Patient is oriented to person, place, and time. Appears well-developed and well-nourished.   HENT: "   Head: Normocephalic and atraumatic.   Eyes: Conjunctivae and EOM are normal. Pupils are equal, round, and reactive to light.   Cardiovascular: Normal rate and intact distal pulses.   Pulmonary/Chest: Effort normal and breath sounds normal.   Musculoskeletal:   See detailed exam below   Neurological: Alert and oriented to person, place, and time. No sensory deficit. Coordination normal.   Skin: Skin is warm and dry. Capillary refill takes less than 2 seconds. No rash noted. No erythema.   Psychiatric: Patient has a normal mood and affect. Her behavior is normal. Judgment and thought content normal.   Nursing note and vitals reviewed.      Ortho Exam:   Right hip (djd):   Neurovascular status is intact. Patient does have a limp on the affected side. Internal and external rotations are associated with pain and discomfort. Anterior joint line pain and tenderness is significant. Stinchfield sign is positive. Figure of 4 sign is positive. Patient is unable to perform an active straight leg raise exam. Greater trochanter is tender. Crossover adduction test is positive. Cross body adduction is limited and painful for the patient. Patient has very significant limp and joint line tenderness anteriorly, posteriorly and medially. Dorsalis pedis and posterior tibial artery pulses are palpable. Common peroneal nerve function is well preserved.           Diagnostics:  X-Ray Report:  Pelvis with Right hip X-Ray  Indication: Evaluation of right hip pain  AP, Lateral views  Findings: advanced arthritis of the right hip with complete loss of joint space, most prominent at the superior aspect of the joint  Bony lesion: no  Soft tissues: within normal limits  Joint spaces: decreased  Hardware appropriately positioned: left hip replacement in appropriate position  Prior studies available for comparison: no   X-RAY was ordered and reviewed by Deepak Oconnor PA-C        Assessment:  Vimal was seen today for establish care and  pain.    Diagnoses and all orders for this visit:    Primary osteoarthritis of right hip  -     XR Hip With or Without Pelvis 2 - 3 View Right  -     External [facility] Surgical/Procedural Request  -     Urinalysis With Culture If Indicated -; Future    Other orders  -     Follow Anesthesia Guidelines / Standing Orders; Future  -     Obtain informed consent  -     Care Order / Instruction for all Female Patients        Plan    · There was a detailed discussion between the patient and myself regarding her current symptoms, physical exam findings and imaging results.  The following treatment options were discussed:  · Steroid injection discussed   · Activity modifications discussed and recommended  · Medication options discussed and recommended  · Patient is not interested in injections but would like to proceed with scheduling surgery.  · Surgical options discussed and patient scheduled for right total hip replacement-direct anterior  · Rest, ice, compression, and elevation (RICE) therapy  · Stretching and strengthening exercises  · Alternate OTC Ibuprofen and Tylenol as needed/if clinically appropriate  The patient was seen today for preoperative discussion.  The patient has been tried on over-the-counter and prescription NSAID's despite the risks of anti-inflammatory bleeding, peptic ulcers and erosive gastritis with short term benefit only.  Braces have been prescribed for mechanical support.  Patient has been participating in an exercise program specifically targeting joint pain relief with limited benefit. Intraarticular injections have been used periodically with some but not complete relief of pain.  Ambulation aids have also been utilized.    · The details of the surgical procedure were explained including the location of probable incisions and a description of the likely hardware/grafts to be used. The patient understands the likely convalescence after surgery as well as the rehabilitation required.   Also, we have thoroughly discussed with the patient the risks, benefits and alternatives to surgery.  Risks include but are not limited to the risk of infection, joint stiffness, limited range of motion, wound healing problems, scar tissue build up, myocardial infarction, stroke, blood clots (including DVT and/or pulmonary embolus along with the risk of death) neurologic and/or vascular injury, limb length discrepancy, fracture, dislocation, nonunion, malunion, continued pain and need for further surgery including hardware failure requiring revision.      Date of encounter: 01/15/2020   Deepak Oconnor PA-C    Electronically signed by Deepak Oconnor PA-C, 01/15/20, 9:52 PM.

## 2020-01-23 ENCOUNTER — TELEPHONE (OUTPATIENT)
Dept: ORTHOPEDIC SURGERY | Facility: CLINIC | Age: 80
End: 2020-01-23

## 2020-02-24 ENCOUNTER — OUTSIDE FACILITY SERVICE (OUTPATIENT)
Dept: ORTHOPEDIC SURGERY | Facility: CLINIC | Age: 80
End: 2020-02-24

## 2020-02-24 PROCEDURE — 27130 TOTAL HIP ARTHROPLASTY: CPT | Performed by: ORTHOPAEDIC SURGERY

## 2020-02-25 ENCOUNTER — TELEPHONE (OUTPATIENT)
Dept: ORTHOPEDIC SURGERY | Facility: CLINIC | Age: 80
End: 2020-02-25

## 2020-03-06 ENCOUNTER — OFFICE VISIT (OUTPATIENT)
Dept: ORTHOPEDIC SURGERY | Facility: CLINIC | Age: 80
End: 2020-03-06

## 2020-03-06 DIAGNOSIS — Z96.641 STATUS POST TOTAL REPLACEMENT OF RIGHT HIP: Primary | ICD-10-CM

## 2020-03-06 DIAGNOSIS — Z09 POSTOPERATIVE FOLLOW-UP: ICD-10-CM

## 2020-03-06 PROCEDURE — 99024 POSTOP FOLLOW-UP VISIT: CPT | Performed by: PHYSICIAN ASSISTANT

## 2020-03-06 PROCEDURE — 73502 X-RAY EXAM HIP UNI 2-3 VIEWS: CPT | Performed by: PHYSICIAN ASSISTANT

## 2020-03-06 RX ORDER — TRAMADOL HYDROCHLORIDE 50 MG/1
TABLET ORAL
Qty: 40 TABLET | Refills: 0 | Status: SHIPPED | OUTPATIENT
Start: 2020-03-06

## 2020-03-06 RX ORDER — PAROXETINE HYDROCHLORIDE 20 MG/1
20 TABLET, FILM COATED ORAL DAILY
COMMUNITY
Start: 2019-12-03

## 2020-03-06 RX ORDER — ICOSAPENT ETHYL 1000 MG/1
CAPSULE ORAL EVERY 12 HOURS SCHEDULED
COMMUNITY

## 2020-03-06 RX ORDER — EZETIMIBE 10 MG/1
TABLET ORAL
COMMUNITY
Start: 2019-12-31

## 2020-03-06 RX ORDER — TRAMADOL HYDROCHLORIDE 50 MG/1
50 TABLET ORAL EVERY 4 HOURS PRN
COMMUNITY
Start: 2020-02-25

## 2020-03-06 RX ORDER — RIVAROXABAN 10 MG/1
10 TABLET, FILM COATED ORAL DAILY
COMMUNITY
Start: 2020-02-24

## 2020-03-06 NOTE — PROGRESS NOTES
POST OPERATIVE FOLLOW UP (Global)      NAME: Vimal Garcia           : 1940    MRN: [id number]      Chief Complaint   Patient presents with   • Right Hip - Follow-up, Post-op     Date of Surgery: 2020  ?     HPI  Vimal Garcia presents for 11 day postoperative follow up s/p right total hip arthroplasty performed by Kalen Harris MD. The patient has had a relatively normal postoperative course.  The patient has had no current complaints. The patient has had improving normal postoperative pain.  The patient has had no issues with the wound.  She is using tramadol for pain control and does need a refill.        Physical Exam  General: alert, appears stated age, cooperative and no distress  Incision/Skin: healing well, no drainage, no erythema, no seroma, no swelling, incision well approximated  Musculoskeletal:   Calf is soft and nontender with a negative Homans sign.  Skin and soft tissues are appropriate for postoperative status of the patient.   Neurovascular status is intact.  Common peroneal nerve function is well preserved.  Dorsalis pedis and posterior tibial artery pulses are palpable.  Gait has significantly improved.  Range of motion: Hip flexion is 0-60 degrees,   Hip abduction is 0-30 degrees.  There is not limb length discrepancy.      Diagnostic Data:  XR - 1 Result   I have personally reviewed   Results for orders placed in visit on 20   XR HIP W OR WO PELVIS 2-3 VIEW RIGHT 3/6/2020    and my interpretation of the image is as follows:     Findings: Well placed implants with a good press-fit profile without any subsidence or periprosthetic fractures  Bony lesion: no  Soft tissues: within normal limits  Joint spaces: within normal limits  Hardware appropriately positioned: yes  Prior studies available for comparison: yes        Assessment/Plan   Assessment:    1. Status post total replacement of right hip    2. Postoperative follow-up          Plan:   Orders Placed This Encounter    Procedures   • XR Hip With or Without Pelvis 2 - 3 View Right      • Wound care instructions given  • Ice and/or modalities as beneficial  • Watch for signs and symptoms of infection  • Elevate leg for residual swelling  • Physical therapy program ongoing  • Weight bearing parameters reviewed  • Take prescribed medications as instructed, but only as tolerated  • Aftercare and dental prophylaxis for joint replacement surgery.  • Discussion of orthopaedic goals and activities and patient and/or guardian expressed appreciation.  • Follow up in 4-6 weeks with Dr. Kimberly Oconnor PA-C  03/06/2020     Electronically signed by Deepak Oconnor PA-C, 03/06/20, 1:59 PM.    EMR Dragon/Transcription disclaimer:  Much of this encounter note is an electronic transcription/translation of spoken language to printed text. The electronic translation of spoken language may permit erroneous, or at times, nonsensical words or phrases to be inadvertently transcribed; Although I have reviewed the note for such errors, some may still exist.

## 2020-03-18 DIAGNOSIS — Z96.641 STATUS POST TOTAL REPLACEMENT OF RIGHT HIP: Primary | ICD-10-CM

## 2020-04-15 ENCOUNTER — OFFICE VISIT (OUTPATIENT)
Dept: ORTHOPEDIC SURGERY | Facility: CLINIC | Age: 80
End: 2020-04-15

## 2020-04-15 DIAGNOSIS — Z96.641 STATUS POST TOTAL REPLACEMENT OF RIGHT HIP: Primary | ICD-10-CM

## 2020-04-15 DIAGNOSIS — Z09 POSTOPERATIVE FOLLOW-UP: ICD-10-CM

## 2020-04-15 PROCEDURE — 99024 POSTOP FOLLOW-UP VISIT: CPT | Performed by: PHYSICIAN ASSISTANT

## 2020-04-15 PROCEDURE — 73502 X-RAY EXAM HIP UNI 2-3 VIEWS: CPT | Performed by: PHYSICIAN ASSISTANT

## 2020-04-15 NOTE — PROGRESS NOTES
POST OP TOTAL GLOBAL      NAME: Vimal Garcia           : 1940    MRN: [id number]    Chief Complaint   Patient presents with   • Right Hip - Follow-up, Post-op       Date of Surgery: ***  ?  HPI:   Patient returns today for *** {Time; day/week/month:34679} follow up of {Post op anatomical location:95390} {AS Incision/arthroscopic portals:51653} {Incision Healin}. Patient reports doing well with no unusual complaints. No fevers, rigors or chills. Appears to be progressing appropriately. Patient is on appropriate anticoagulation.       Review of Systems:      Ortho Exam:   {Post Op Total Joint Arthroplasty:18159}      Diagnostic Studies:  {Diagnostics options AS:08943}        Assessment:  There are no diagnoses linked to this encounter.        Plan   · Incision care  · To use ACE wrap/TACO stocking  · Continue ice to joint   · Stretching and strengthening exercises  · {AS Expected ROM:24585}  · Falls precautions  · {anticoagulation guidelines post op AS:23898}  · Continue with lifelong antibiotic prophylaxis with dental procedures following total joint replacement.  · Follow up in *** {TIME; DAY/WK/MO/YR(S):84697}    Date of encounter: 04/15/2020   Samantha Alvarenga MA

## 2020-04-15 NOTE — PROGRESS NOTES
POST OPERATIVE FOLLOW UP (Global)      NAME: Vimal Garcia           : 1940    MRN: [id number]      Chief Complaint   Patient presents with   • Right Hip - Follow-up, Post-op     Date of Surgery: 20  ?     HPI  Vimal Garcia presents for 7 week postoperative follow up s/p right total hip arthroplasty performed by Kalen Harris MD. The patient has had a relatively normal postoperative course.  The patient has had no current complaints. The patient has had improving normal postoperative pain.  The patient has had no issues with the wound.         Physical Exam  General: alert, appears stated age, cooperative and no distress  Incision/Skin: no drainage, no erythema, no seroma, no swelling, well healed, incision well approximated  Musculoskeletal:   Calf is soft and nontender with a negative Homans sign.  Skin and soft tissues are appropriate for postoperative status of the patient.   Neurovascular status is intact.  Common peroneal nerve function is well preserved.  Dorsalis pedis and posterior tibial artery pulses are palpable.  Gait has significantly improved.  Range of motion: Hip flexion is 0-100 degrees,   Hip abduction is 40 degrees.  There is no limb length discrepancy.      Diagnostic Data:  XR - 1 Result   I have personally reviewed   Results for orders placed in visit on 04/15/20   XR HIP W OR WO PELVIS 2-3 VIEW RIGHT 4/15/2020    and my interpretation of the image is as follows:   Indication: Evaluation of right total hip arthroplasty-direct anterior  Findings: Well positioned implant with good pressfit profile  Bony Lesion: No  Soft tissues: within normal limits  Joint spaces: WNL  Hardware appropriately positioned: yes  Prior studies available for comparison: yes   X-RAY was ordered and reviewed by Deepak Oconnor PA-C          Assessment/Plan   Assessment:    1. Status post total replacement of right hip    2. Postoperative follow-up          Plan:   Orders Placed This Encounter    Procedures   • XR Hip With or Without Pelvis 2 - 3 View Right      • Wound care instructions given  • Ice and/or modalities as beneficial  • Watch for signs and symptoms of infection  • Weight bearing parameters reviewed  • Take prescribed medications as instructed, but only as tolerated  • Aftercare and dental prophylaxis for joint replacement surgery.  • Discussion of orthopaedic goals and activities and patient and/or guardian expressed appreciation.  • Follow up in 6 weeks         Deepak Oconnor PA-C  04/15/2020     Electronically signed by Deepak Oconnor PA-C, 04/15/20, 9:59 AM.    EMR Dragon/Transcription disclaimer:  Much of this encounter note is an electronic transcription/translation of spoken language to printed text. The electronic translation of spoken language may permit erroneous, or at times, nonsensical words or phrases to be inadvertently transcribed; Although I have reviewed the note for such errors, some may still exist.

## 2020-05-28 ENCOUNTER — OFFICE VISIT (OUTPATIENT)
Dept: ORTHOPEDIC SURGERY | Facility: CLINIC | Age: 80
End: 2020-05-28

## 2020-05-28 VITALS — TEMPERATURE: 97.2 F | WEIGHT: 184 LBS | BODY MASS INDEX: 28.88 KG/M2 | HEIGHT: 67 IN

## 2020-05-28 DIAGNOSIS — Z96.641 STATUS POST TOTAL REPLACEMENT OF RIGHT HIP: Primary | ICD-10-CM

## 2020-05-28 PROCEDURE — 99212 OFFICE O/P EST SF 10 MIN: CPT | Performed by: ORTHOPAEDIC SURGERY

## 2020-05-28 NOTE — PROGRESS NOTES
FOLLOW UP VISIT    Patient: Vimal Garcia  ?  YOB: 1940    MRN: [id number]  ?  Chief Complaint   Patient presents with   • Right Hip - Follow-up      ?  HPI:   Vimal Pastor underwent a right total hip arthroplasty on 24 February 2020.  I did this surgery through an anterior approach.  She has done extremely well postoperatively.  She has no symptoms whatsoever.  Her range of motion is improved to near normal.  She is ambulating without the assistance of a cane or walker.  She is very pleased with her outcome.  She states that her quality of life is improved significantly for the better following the surgery.  She does not have any need for the use of narcotic medication to control her symptoms at this point.  Pain Location: right hip  Radiation: none  Quality: dull  Intensity/Severity: none    This patient is an established patient.  This problem is not new to this examiner.      Allergies:   Allergies   Allergen Reactions   • Niacin Itching   • Niaspan [Niacin Er] Itching       Medications:   Home Medications:  Current Outpatient Medications on File Prior to Visit   Medication Sig   • atorvastatin (LIPITOR) 40 MG tablet Take 40 mg by mouth Daily.   • benazepril (LOTENSIN) 40 MG tablet    • choline fenofibrate (TRILIPIX) 135 MG capsule Trilipix 135 mg capsule,delayed release   • CloNIDine (CATAPRES) 0.1 MG tablet    • ezetimibe (ZETIA) 10 MG tablet    • icosapent ethyl (VASCEPA) 1 g capsule capsule Every 12 (Twelve) Hours.   • metFORMIN (GLUCOPHAGE) 1000 MG tablet Take 1,000 mg by mouth 2 (Two) Times a Day With Meals.   • metoprolol succinate XL (TOPROL-XL) 100 MG 24 hr tablet    • Omega-3 Fatty Acids (FISH OIL) 1000 MG capsule capsule Take  by mouth Daily With Breakfast.   • PARoxetine (PAXIL) 20 MG tablet Take 20 mg by mouth Daily.   • traMADol (ULTRAM) 50 MG tablet 1-2 Oral Q4H PRN severe pain   • traMADol (ULTRAM) 50 MG tablet Take 50 mg by mouth Every 4 (Four) Hours As Needed. for pain  "  • verapamil PM (VERELAN PM) 360 MG 24 hr capsule    • XARELTO 10 MG tablet Take 10 mg by mouth Daily.     No current facility-administered medications on file prior to visit.      Current Medications:  Scheduled Meds:  PRN Meds:.    I have reviewed the patient's medical history in detail and updated the computerized patient record.  Review and summarization of old records include:    Past Medical History:   Diagnosis Date   • Diabetes (CMS/HCC)    • Hyperlipidemia    • Hypertension      Past Surgical History:   Procedure Laterality Date   • HIP SURGERY     • JOINT REPLACEMENT       Social History     Occupational History   • Not on file   Tobacco Use   • Smoking status: Never Smoker   Substance and Sexual Activity   • Alcohol use: Yes     Comment: occasional   • Drug use: Not on file   • Sexual activity: Not on file      Family History   Problem Relation Age of Onset   • Cancer Mother         uterine         Review of Systems   Constitutional: Negative.    HENT: Negative.    Eyes: Negative.    Respiratory: Negative.    Cardiovascular: Negative.    Gastrointestinal: Negative.    Endocrine: Negative.    Genitourinary: Negative.    Musculoskeletal: Positive for arthralgias and gait problem.   Skin: Negative.    Allergic/Immunologic: Negative.    Hematological: Negative.    Psychiatric/Behavioral: Negative.           Wt Readings from Last 3 Encounters:   05/28/20 83.5 kg (184 lb)   01/15/20 86.1 kg (189 lb 14.4 oz)   07/15/19 83.5 kg (184 lb)     Ht Readings from Last 3 Encounters:   05/28/20 168.9 cm (66.5\")   01/15/20 165.1 cm (65\")   07/15/19 167.6 cm (66\")     Body mass index is 29.25 kg/m².  Facility age limit for growth percentiles is 20 years.  Vitals:    05/28/20 0924   Temp: 97.2 °F (36.2 °C)         Physical Exam  Constitutional: Patient is oriented to person, place, and time. Appears well-developed and well-nourished.   HENT:   Head: Normocephalic and atraumatic.   Eyes: Conjunctivae and EOM are normal. " Pupils are equal, round, and reactive to light.   Cardiovascular: Normal rate, regular rhythm, normal heart sounds and intact distal pulses.   Pulmonary/Chest: Effort normal and breath sounds normal.   Musculoskeletal:   See detailed exam below   Neurological: Alert and oriented to person, place, and time. No sensory deficit. Coordination normal.   Skin: Skin is warm and dry. Capillary refill takes less than 2 seconds. No rash noted. No erythema.   Psychiatric: Patient has a normal mood and affect. Her behavior is normal. Judgment and thought content normal.   Nursing note and vitals reviewed.    Ortho Exam:    Right hip. Patient is post op 3 month(s) from total hip arthoplasty, direct anterior. Incision is clean and healing well. Calf is soft and nontender. Homans sign is negative. Skin and soft tissues are appropriate for postoperative status of the patient. Hip flexion is 0-90 degrees, hip abduction is 0-30 degrees. No limb lenth discrepancy. Neurovascular status is intact. Patients gait is significantly improved. The pain relief is extremely dramatic for the patient. Dorsalis pedis and posterior tibial artery pulses palpable. Common peroneal function is well preserved. There is no evidence of cellulitis or erythema or deep seated joint infection.        Diagnostics:  no diagnostic testing performed this visit      Assessment:  Vimal was seen today for follow-up.    Diagnoses and all orders for this visit:    Status post total replacement of right hip          Procedures  ?    Plan    · Falls precautions and dislocation precautions discussed with the patient.  · Use a cane for assistance with ambulation if needed.  · Calcium and vitamin D for bone health.  · , GI and dental procedure prophylaxis with antibiotics to prevent metastatic infection of the hip arthroplasty implants.  · Rest, ice, compression, and elevation (RICE) therapy  · Stretching and strengthening exercises  · Tylenol 500-1000mg by mouth every  6 hours as needed for pain   · Follow up in 1 year(s)    Date of encounter: 05/28/2020   Kalen Harris MD